# Patient Record
Sex: FEMALE | HISPANIC OR LATINO | URBAN - METROPOLITAN AREA
[De-identification: names, ages, dates, MRNs, and addresses within clinical notes are randomized per-mention and may not be internally consistent; named-entity substitution may affect disease eponyms.]

---

## 2017-03-01 ENCOUNTER — APPOINTMENT (OUTPATIENT)
Dept: URBAN - METROPOLITAN AREA CLINIC 319 | Age: 22
Setting detail: DERMATOLOGY
End: 2017-03-01

## 2017-03-01 DIAGNOSIS — L50.1 IDIOPATHIC URTICARIA: ICD-10-CM

## 2017-03-01 PROCEDURE — 11100: CPT

## 2017-03-01 PROCEDURE — OTHER BIOPSY BY PUNCH METHOD: OTHER

## 2017-03-01 PROCEDURE — OTHER COUNSELING: OTHER

## 2017-03-01 PROCEDURE — 11101: CPT

## 2017-03-01 PROCEDURE — OTHER PRESCRIPTION: OTHER

## 2017-03-01 PROCEDURE — OTHER TREATMENT REGIMEN: OTHER

## 2017-03-01 RX ORDER — CETIRIZINE HCL/PSEUDOEPHEDRINE 5 MG-120MG
TABLET, EXTENDED RELEASE 12 HR ORAL QD
Qty: 30 | Refills: 3 | Status: ERX | COMMUNITY
Start: 2017-03-01

## 2017-03-01 RX ORDER — SALICYLIC ACID 3 G/100G
LOTION TOPICAL
Qty: 30 | Refills: 5 | Status: ERX | COMMUNITY
Start: 2017-03-01

## 2017-03-01 RX ORDER — PREDNISONE 10 MG/1
TABLET ORAL
Qty: 40 | Refills: 1 | Status: ERX | COMMUNITY
Start: 2017-03-01

## 2017-03-01 RX ORDER — HYDROXYZINE HYDROCHLORIDE 10 MG/1
TABLET, FILM COATED ORAL QHS
Qty: 30 | Refills: 3 | Status: ERX | COMMUNITY
Start: 2017-03-01

## 2017-03-01 ASSESSMENT — LOCATION ZONE DERM: LOCATION ZONE: TRUNK

## 2017-03-01 ASSESSMENT — LOCATION DETAILED DESCRIPTION DERM
LOCATION DETAILED: RIGHT LATERAL ABDOMEN
LOCATION DETAILED: PERIUMBILICAL SKIN

## 2017-03-01 ASSESSMENT — LOCATION SIMPLE DESCRIPTION DERM: LOCATION SIMPLE: ABDOMEN

## 2017-03-01 NOTE — PROCEDURE: BIOPSY BY PUNCH METHOD
Anesthesia Volume In Cc (Will Not Render If 0): 1.5
Home Suture Removal Text: Patient was provided a home suture removal kit and will remove their sutures at home.  If they have any questions or difficulties they will call the office.
Notification Instructions: Patient will be notified of biopsy results. However, patient instructed to call the office if not contacted within 2 weeks.
Bill For Surgical Tray: no
X Size Of Lesion In Cm (Optional): 0
Anesthesia Type: 1% lidocaine with epinephrine
Wound Care: Aquaphor
Epidermal Sutures: 5-0 Ethilon
Consent: Written consent was obtained and risks were reviewed including but not limited to scarring, infection, bleeding, scabbing, incomplete removal, nerve damage and allergy to anesthesia.
Suture Removal: 14 days
Hemostasis: None
Punch Size In Mm: 4
Billing Type: Third-Party Bill
Detail Level: Detailed
Post-Care Instructions: I reviewed with the patient in detail post-care instructions. Patient is to keep the biopsy site dry overnight, and then apply bacitracin twice daily until healed. Patient may apply hydrogen peroxide soaks to remove any crusting.
Biopsy Type: H and E
Dressing: bandage

## 2017-03-15 ENCOUNTER — APPOINTMENT (OUTPATIENT)
Dept: URBAN - METROPOLITAN AREA CLINIC 319 | Age: 22
Setting detail: DERMATOLOGY
End: 2017-03-15

## 2017-03-15 DIAGNOSIS — L50.1 IDIOPATHIC URTICARIA: ICD-10-CM

## 2017-03-15 PROCEDURE — 99213 OFFICE O/P EST LOW 20 MIN: CPT

## 2017-03-15 PROCEDURE — OTHER TREATMENT REGIMEN: OTHER

## 2017-03-15 PROCEDURE — OTHER COUNSELING: OTHER

## 2017-03-15 ASSESSMENT — LOCATION DETAILED DESCRIPTION DERM
LOCATION DETAILED: RIGHT LATERAL ABDOMEN
LOCATION DETAILED: LEFT LATERAL ABDOMEN

## 2017-03-15 ASSESSMENT — LOCATION ZONE DERM: LOCATION ZONE: TRUNK

## 2017-03-15 ASSESSMENT — LOCATION SIMPLE DESCRIPTION DERM: LOCATION SIMPLE: ABDOMEN

## 2017-03-15 NOTE — PROCEDURE: TREATMENT REGIMEN
Continue Regimen: Hydroxizine hcl 10 mg take 1-2 tabs every 4 hours as needed.\\nAllegra 180 take 1 tab in the morning.\\nZyrtec 10 mg take 1 tab in the evening.
Detail Level: Detailed
Initiate Treatment: Prednisone take 4 tabs for 4 days, 3 tabs for 4 days, 2 tabs for 4 days, 1 tab for 4 days, 1\2 tab for 4 days.

## 2017-03-22 ENCOUNTER — APPOINTMENT (OUTPATIENT)
Dept: URBAN - METROPOLITAN AREA CLINIC 319 | Age: 22
Setting detail: DERMATOLOGY
End: 2017-03-22

## 2017-03-22 DIAGNOSIS — L50.1 IDIOPATHIC URTICARIA: ICD-10-CM

## 2017-03-22 PROCEDURE — OTHER SUTURE REMOVAL (GLOBAL PERIOD): OTHER

## 2017-03-22 PROCEDURE — OTHER TREATMENT REGIMEN: OTHER

## 2017-03-22 ASSESSMENT — LOCATION DETAILED DESCRIPTION DERM
LOCATION DETAILED: RIGHT LATERAL ABDOMEN
LOCATION DETAILED: LEFT LATERAL ABDOMEN

## 2017-03-22 ASSESSMENT — LOCATION SIMPLE DESCRIPTION DERM: LOCATION SIMPLE: ABDOMEN

## 2017-03-22 ASSESSMENT — LOCATION ZONE DERM: LOCATION ZONE: TRUNK

## 2017-03-22 NOTE — PROCEDURE: SUTURE REMOVAL (GLOBAL PERIOD)
Detail Level: Detailed
Add 00341 Cpt? (Important Note: In 2017 The Use Of 61073 Is Being Tracked By Cms To Determine Future Global Period Reimbursement For Global Periods): no

## 2017-03-22 NOTE — PROCEDURE: TREATMENT REGIMEN
Plan: If begins to re-occur will consider additional prednisone
Continue Regimen: Allegra every morning \\nZyrtec every night\\nComplete tapering Prednisone \\nHydroxyzine 10mg take one or two every 4hrs as needed for itching
Detail Level: Detailed